# Patient Record
Sex: FEMALE | Race: WHITE | URBAN - METROPOLITAN AREA
[De-identification: names, ages, dates, MRNs, and addresses within clinical notes are randomized per-mention and may not be internally consistent; named-entity substitution may affect disease eponyms.]

---

## 2017-10-12 ENCOUNTER — OFFICE VISIT (OUTPATIENT)
Dept: ENT CLINIC | Age: 62
End: 2017-10-12

## 2017-10-12 VITALS
HEIGHT: 68 IN | BODY MASS INDEX: 25.91 KG/M2 | SYSTOLIC BLOOD PRESSURE: 119 MMHG | WEIGHT: 171 LBS | DIASTOLIC BLOOD PRESSURE: 76 MMHG

## 2017-10-12 DIAGNOSIS — R51.9 CHRONIC NONINTRACTABLE HEADACHE, UNSPECIFIED HEADACHE TYPE: Primary | ICD-10-CM

## 2017-10-12 DIAGNOSIS — G89.29 CHRONIC NONINTRACTABLE HEADACHE, UNSPECIFIED HEADACHE TYPE: Primary | ICD-10-CM

## 2017-10-12 PROBLEM — M85.80 OSTEOPENIA: Status: ACTIVE | Noted: 2017-06-19

## 2017-10-12 PROBLEM — F11.20 NARCOTIC ADDICTION (HCC): Status: ACTIVE | Noted: 2017-10-12

## 2017-10-12 PROBLEM — J32.9 SINUSITIS, CHRONIC: Status: ACTIVE | Noted: 2017-10-12

## 2017-10-12 PROBLEM — F51.04 CHRONIC INSOMNIA: Status: ACTIVE | Noted: 2017-10-12

## 2017-10-12 PROBLEM — B00.9 HERPES: Status: ACTIVE | Noted: 2017-10-12

## 2017-10-12 PROBLEM — M19.049 HAND ARTHRITIS: Status: ACTIVE | Noted: 2017-10-12

## 2017-10-12 PROCEDURE — 99203 OFFICE O/P NEW LOW 30 MIN: CPT | Performed by: OTOLARYNGOLOGY

## 2017-10-12 RX ORDER — CONJUGATED ESTROGENS 0.62 MG/G
CREAM VAGINAL
Refills: 3 | COMMUNITY
Start: 2017-09-11

## 2017-10-12 RX ORDER — PAROXETINE 10 MG/1
TABLET, FILM COATED ORAL
COMMUNITY
Start: 2017-06-19

## 2017-10-12 RX ORDER — BUTALBITAL, ACETAMINOPHEN AND CAFFEINE 50; 325; 40 MG/1; MG/1; MG/1
TABLET ORAL
COMMUNITY
Start: 2017-10-02

## 2017-10-12 RX ORDER — ACYCLOVIR 50 MG/G
CREAM TOPICAL
COMMUNITY
Start: 2016-03-31

## 2017-10-12 RX ORDER — BUPROPION HYDROCHLORIDE 300 MG/1
TABLET ORAL
COMMUNITY
Start: 2017-08-21

## 2017-10-12 RX ORDER — SUMATRIPTAN 100 MG/1
100 TABLET, FILM COATED ORAL
COMMUNITY
Start: 2016-03-31

## 2017-10-12 RX ORDER — MONTELUKAST SODIUM 10 MG/1
TABLET ORAL
COMMUNITY
Start: 2017-05-22

## 2017-10-12 RX ORDER — TRAMADOL HYDROCHLORIDE 50 MG/1
TABLET ORAL
Refills: 0 | COMMUNITY
Start: 2017-10-09

## 2017-10-12 RX ORDER — GABAPENTIN 300 MG/1
CAPSULE ORAL
COMMUNITY
Start: 2017-08-25

## 2017-10-12 RX ORDER — PRAVASTATIN SODIUM 40 MG
TABLET ORAL
COMMUNITY
Start: 2017-05-22

## 2017-10-12 RX ORDER — METHOCARBAMOL 750 MG/1
750 TABLET, FILM COATED ORAL
COMMUNITY
Start: 2017-06-19

## 2017-10-12 RX ORDER — ZOLPIDEM TARTRATE 5 MG/1
TABLET ORAL
COMMUNITY
Start: 2017-09-25

## 2017-10-12 NOTE — PROGRESS NOTES
CHIEF COMPLAINT: Headaches    HISTORY OF PRESENT ILLNESS:  64 y.o. female who presents with long history of headaches. Bilateral temporal, vertex, radiate to occiput. Many years duration. Takes flonase. No nasal obstruction. No rhinorrhea. Occurs daily, worse in the AM. Ears are OK. Throat is OK. PAST MEDICAL HISTORY:   History   Smoking Status    Never Smoker   Smokeless Tobacco    Never Used                                                    History   Alcohol use Not on file                                                    Current Outpatient Prescriptions:     SUMAtriptan (IMITREX) 100 MG tablet, Take 100 mg by mouth, Disp: , Rfl:     acyclovir (ZOVIRAX) 5 % CREA, Apply 4-5 x daily prn as directed., Disp: , Rfl:     pravastatin (PRAVACHOL) 40 MG tablet, Take 1 tablet by mouth  daily, Disp: , Rfl:     montelukast (SINGULAIR) 10 MG tablet, Take 1 tablet by mouth  daily, Disp: , Rfl:     methocarbamol (ROBAXIN) 750 MG tablet, Take 750 mg by mouth, Disp: , Rfl:     buPROPion (WELLBUTRIN XL) 300 MG extended release tablet, Take 1 tablet by mouth  every morning, Disp: , Rfl:     gabapentin (NEURONTIN) 300 MG capsule, TAKE ONE TO TWO CAPSULES BY MOUTH ONCE NIGHTLY, Disp: , Rfl:     PREMARIN 0.625 MG/GM vaginal cream, I 1 GRAM VAGINALLY 2 TIMES WEEKLY, Disp: , Rfl: 3    zolpidem (AMBIEN) 5 MG tablet, TAKE 1 TABLET BY MOUTH AT BEDTIME AS NEEDED FOR SLEEP, Disp: , Rfl:     traMADol (ULTRAM) 50 MG tablet, TK 1 TO 2 TS TID PRN, Disp: , Rfl: 0    PARoxetine (PAXIL) 10 MG tablet, 1 tab po q evening., Disp: , Rfl:     butalbital-acetaminophen-caffeine (FIORICET, ESGIC) -40 MG per tablet, TAKE 1 TO 2 TABLETS THREE TIMES DAILY AS NEEDED FOR HEADACHE, Disp: , Rfl:                                                History reviewed. No pertinent past medical history. History reviewed. No pertinent surgical history.       REVIEW OF SYSTEMS:  All pertinent positive and negative review of systems included in HPI. Otherwise, all systems are reviewed and negative. PHYSICAL EXAMINATION:   GENERAL: wdwn- no acute distress  COMMUNICATION :  Normal voice  MENTAL STATUS:  Normal  HEAD AND FACE:  Normal  EXTERNAL EARS AND NOSE:  Normal  FACIAL MUSCLES:  Normal  EXTRAOCULAR MUSCLES: Intact  FACE PALPATION:  Negative  OTOSCOPY:  Normal tympanic membranes and middle ear spaces  TUNING FORKS: Rinne ++ Montero midline at 512 Hz  INTRANASAL:  Septum midline, turbinates normal, meati clear.   LIPS, TEETH, GINGIVA:  Normal mucosa  PHARYNX:  Normal  NECK:  No masses or adenopathy  SALIVARY GLANDS:  Normal  THYROID:  Normal    IMPRESSION: Headache sounds more like muscle tension pain    PLAN: Trial of afrin to relieve pain    FOLLOW-UP: After afrin trial.